# Patient Record
Sex: MALE | Race: WHITE | NOT HISPANIC OR LATINO
[De-identification: names, ages, dates, MRNs, and addresses within clinical notes are randomized per-mention and may not be internally consistent; named-entity substitution may affect disease eponyms.]

---

## 2020-01-06 PROBLEM — Z00.00 ENCOUNTER FOR PREVENTIVE HEALTH EXAMINATION: Status: ACTIVE | Noted: 2020-01-06

## 2020-01-07 ENCOUNTER — APPOINTMENT (OUTPATIENT)
Dept: OTOLARYNGOLOGY | Facility: CLINIC | Age: 70
End: 2020-01-07
Payer: COMMERCIAL

## 2020-01-07 VITALS
DIASTOLIC BLOOD PRESSURE: 81 MMHG | BODY MASS INDEX: 22.4 KG/M2 | HEART RATE: 77 BPM | WEIGHT: 160 LBS | SYSTOLIC BLOOD PRESSURE: 119 MMHG | HEIGHT: 71 IN

## 2020-01-07 DIAGNOSIS — H93.12 TINNITUS, LEFT EAR: ICD-10-CM

## 2020-01-07 DIAGNOSIS — H93.292 OTHER ABNORMAL AUDITORY PERCEPTIONS, LEFT EAR: ICD-10-CM

## 2020-01-07 PROCEDURE — 92557 COMPREHENSIVE HEARING TEST: CPT

## 2020-01-07 PROCEDURE — 99203 OFFICE O/P NEW LOW 30 MIN: CPT

## 2020-01-07 PROCEDURE — 92550 TYMPANOMETRY & REFLEX THRESH: CPT

## 2020-01-07 NOTE — HISTORY OF PRESENT ILLNESS
[de-identified] : ABDIAZIZ FRANCO is a 69 year man with a history of possible hearing loss. He complains of several months of fullness, decreased hearing and mild tinnitus AS.

## 2020-01-07 NOTE — CONSULT LETTER
[Dear  ___] : Dear  [unfilled], [Consult Closing:] : Thank you very much for allowing me to participate in the care of this patient.  If you have any questions, please do not hesitate to contact me. [Sincerely,] : Sincerely, [FreeTextEntry3] : Ramu Whipple MD\par

## 2020-01-07 NOTE — ASSESSMENT
[FreeTextEntry1] : ABDIAZIZ FRANCO has left sided hearing loss (moderately severe) and left sided tinnitus. I will send him for MRI w/wo contrast of IAC's.

## 2020-09-22 ENCOUNTER — RESULT REVIEW (OUTPATIENT)
Age: 70
End: 2020-09-22

## 2020-09-22 ENCOUNTER — OUTPATIENT (OUTPATIENT)
Dept: OUTPATIENT SERVICES | Facility: HOSPITAL | Age: 70
LOS: 1 days | End: 2020-09-22
Payer: COMMERCIAL

## 2020-09-22 PROCEDURE — A9585: CPT

## 2020-09-22 PROCEDURE — 70553 MRI BRAIN STEM W/O & W/DYE: CPT | Mod: 26

## 2020-09-22 PROCEDURE — 70553 MRI BRAIN STEM W/O & W/DYE: CPT

## 2020-09-29 ENCOUNTER — APPOINTMENT (OUTPATIENT)
Dept: MRI IMAGING | Facility: HOSPITAL | Age: 70
End: 2020-09-29
Payer: COMMERCIAL

## 2020-10-13 ENCOUNTER — OUTPATIENT (OUTPATIENT)
Dept: OUTPATIENT SERVICES | Facility: HOSPITAL | Age: 70
LOS: 1 days | End: 2020-10-13

## 2020-10-13 ENCOUNTER — RESULT REVIEW (OUTPATIENT)
Age: 70
End: 2020-10-13

## 2020-10-13 ENCOUNTER — APPOINTMENT (OUTPATIENT)
Dept: CT IMAGING | Facility: CLINIC | Age: 70
End: 2020-10-13
Payer: COMMERCIAL

## 2020-10-13 PROCEDURE — 70486 CT MAXILLOFACIAL W/O DYE: CPT | Mod: 26

## 2020-10-26 ENCOUNTER — APPOINTMENT (OUTPATIENT)
Dept: OTOLARYNGOLOGY | Facility: CLINIC | Age: 70
End: 2020-10-26
Payer: COMMERCIAL

## 2020-10-26 VITALS — TEMPERATURE: 98.7 F | WEIGHT: 160 LBS | HEIGHT: 71 IN | BODY MASS INDEX: 22.4 KG/M2

## 2020-10-26 DIAGNOSIS — J33.8 OTHER POLYP OF SINUS: ICD-10-CM

## 2020-10-26 PROCEDURE — 99213 OFFICE O/P EST LOW 20 MIN: CPT | Mod: 25

## 2020-10-26 PROCEDURE — 99072 ADDL SUPL MATRL&STAF TM PHE: CPT

## 2020-10-26 PROCEDURE — 31231 NASAL ENDOSCOPY DX: CPT

## 2020-10-26 RX ORDER — FLUTICASONE PROPIONATE 50 MCG
SPRAY, SUSPENSION NASAL
Refills: 0 | Status: ACTIVE | COMMUNITY

## 2020-10-26 NOTE — ASSESSMENT
[FreeTextEntry1] : He is incidentally isolated left sphenoid opacification it is likely fungal in origin.\par I am recommending sphenoidotomy.\par We discussed doing this in the office using balloon sinuplasty in  an image guided fashion versus in the surgical suite.\par He has opted to do this procedure in the office.\par I did explain to him that at times this disease process can occur and sometimes a trip to the operating room as necessary.\par We also talked about risks which include but were not limited to anesthesia, bleeding, infection or skull base injury.

## 2020-10-26 NOTE — HISTORY OF PRESENT ILLNESS
[de-identified] : I am seeing this gentleman today at the suggestion of Dr. jurado.\par He had a workup with an MRI of his temporal bone. 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 It appears to be inspissated mucus or fungal sinusitis.\par He has other scattered sinus disease without air-fluid level

## 2020-10-26 NOTE — PROCEDURE
[FreeTextEntry6] : PROCEDURE NOTES\par \par PROCEDURE: Nasal endoscopy \par SURGEON: Dr. Kaur\par INDICATIONS: Assess for chronic sinusitis. \par ANESTHESIA: The patient was placed in a sitting position.  Following application of the topical anesthetic and decongestant, exam was performed with a zero degree endoscope.  The scope was passed along the right nasal floor to the nasopharynx.  It was then passed into the region of the middle meatus, middle turbinate, and sphenoethmoid region.  An identical procedure was performed on the left side.  The following findings were noted:\par \par The nasal mucosa was healthy appearing and the septum was roughly midline. The middle meatus and sphenoethmoid recesses were clear bilaterally. The nasopharynx was normal. \par

## 2020-11-05 ENCOUNTER — APPOINTMENT (OUTPATIENT)
Dept: OTOLARYNGOLOGY | Facility: CLINIC | Age: 70
End: 2020-11-05

## 2020-11-05 ENCOUNTER — APPOINTMENT (OUTPATIENT)
Dept: OTOLARYNGOLOGY | Facility: CLINIC | Age: 70
End: 2020-11-05
Payer: COMMERCIAL

## 2020-11-05 PROCEDURE — 99441: CPT | Mod: NC

## 2020-11-05 NOTE — HISTORY OF PRESENT ILLNESS
[de-identified] : I did a phone visit with him.\par After seeing need another opinion from an ENT doctor regarding his sphenoid sinusitis.\par According to him that  agreed with my plan.\par The doctor also mentioned to him that he should consider septoplasty.\par \par I explained to him that septoplasty would need to be done in the operating room. The procedure we have a 9 for hemoglobin in the office.\par \par Furthermore I do not see any clinical indication for septoplasty.

## 2020-11-19 ENCOUNTER — LABORATORY RESULT (OUTPATIENT)
Age: 70
End: 2020-11-19

## 2020-11-24 ENCOUNTER — APPOINTMENT (OUTPATIENT)
Dept: OTOLARYNGOLOGY | Facility: CLINIC | Age: 70
End: 2020-11-24
Payer: COMMERCIAL

## 2020-11-24 DIAGNOSIS — J32.3 CHRONIC SPHENOIDAL SINUSITIS: ICD-10-CM

## 2020-11-24 DIAGNOSIS — J32.4 CHRONIC PANSINUSITIS: ICD-10-CM

## 2020-11-24 PROCEDURE — 31297Z: CUSTOM

## 2020-11-24 PROCEDURE — 61782 SCAN PROC CRANIAL EXTRA: CPT

## 2020-11-30 LAB — BACTERIA FLD CULT: NORMAL

## 2022-03-07 ENCOUNTER — EMERGENCY (EMERGENCY)
Facility: HOSPITAL | Age: 72
LOS: 1 days | Discharge: HOME | End: 2022-03-07
Attending: EMERGENCY MEDICINE | Admitting: EMERGENCY MEDICINE
Payer: COMMERCIAL

## 2022-03-07 VITALS
HEART RATE: 100 BPM | SYSTOLIC BLOOD PRESSURE: 140 MMHG | WEIGHT: 160.06 LBS | RESPIRATION RATE: 18 BRPM | OXYGEN SATURATION: 96 % | HEIGHT: 71 IN | TEMPERATURE: 99 F | DIASTOLIC BLOOD PRESSURE: 91 MMHG

## 2022-03-07 VITALS
RESPIRATION RATE: 16 BRPM | OXYGEN SATURATION: 93 % | HEART RATE: 78 BPM | SYSTOLIC BLOOD PRESSURE: 117 MMHG | TEMPERATURE: 101 F | DIASTOLIC BLOOD PRESSURE: 80 MMHG

## 2022-03-07 LAB
ALBUMIN SERPL ELPH-MCNC: 4.4 G/DL — SIGNIFICANT CHANGE UP (ref 3.3–5)
ALP SERPL-CCNC: 79 U/L — SIGNIFICANT CHANGE UP (ref 40–120)
ALT FLD-CCNC: 56 U/L — HIGH (ref 10–45)
ANION GAP SERPL CALC-SCNC: 13 MMOL/L — SIGNIFICANT CHANGE UP (ref 5–17)
AST SERPL-CCNC: 49 U/L — HIGH (ref 10–40)
BASOPHILS # BLD AUTO: 0.02 K/UL — SIGNIFICANT CHANGE UP (ref 0–0.2)
BASOPHILS NFR BLD AUTO: 0.3 % — SIGNIFICANT CHANGE UP (ref 0–2)
BILIRUB SERPL-MCNC: 0.6 MG/DL — SIGNIFICANT CHANGE UP (ref 0.2–1.2)
BUN SERPL-MCNC: 17 MG/DL — SIGNIFICANT CHANGE UP (ref 7–23)
CALCIUM SERPL-MCNC: 9.4 MG/DL — SIGNIFICANT CHANGE UP (ref 8.4–10.5)
CHLORIDE SERPL-SCNC: 100 MMOL/L — SIGNIFICANT CHANGE UP (ref 96–108)
CO2 SERPL-SCNC: 26 MMOL/L — SIGNIFICANT CHANGE UP (ref 22–31)
CREAT SERPL-MCNC: 1.25 MG/DL — SIGNIFICANT CHANGE UP (ref 0.5–1.3)
EGFR: 62 ML/MIN/1.73M2 — SIGNIFICANT CHANGE UP
EOSINOPHIL # BLD AUTO: 0 K/UL — SIGNIFICANT CHANGE UP (ref 0–0.5)
EOSINOPHIL NFR BLD AUTO: 0 % — SIGNIFICANT CHANGE UP (ref 0–6)
GLUCOSE SERPL-MCNC: 104 MG/DL — HIGH (ref 70–99)
HCT VFR BLD CALC: 42.7 % — SIGNIFICANT CHANGE UP (ref 39–50)
HGB BLD-MCNC: 13.7 G/DL — SIGNIFICANT CHANGE UP (ref 13–17)
IMM GRANULOCYTES NFR BLD AUTO: 0.4 % — SIGNIFICANT CHANGE UP (ref 0–1.5)
LYMPHOCYTES # BLD AUTO: 1.5 K/UL — SIGNIFICANT CHANGE UP (ref 1–3.3)
LYMPHOCYTES # BLD AUTO: 19.5 % — SIGNIFICANT CHANGE UP (ref 13–44)
MCHC RBC-ENTMCNC: 30.8 PG — SIGNIFICANT CHANGE UP (ref 27–34)
MCHC RBC-ENTMCNC: 32.1 GM/DL — SIGNIFICANT CHANGE UP (ref 32–36)
MCV RBC AUTO: 96 FL — SIGNIFICANT CHANGE UP (ref 80–100)
MONOCYTES # BLD AUTO: 0.9 K/UL — SIGNIFICANT CHANGE UP (ref 0–0.9)
MONOCYTES NFR BLD AUTO: 11.7 % — SIGNIFICANT CHANGE UP (ref 2–14)
NEUTROPHILS # BLD AUTO: 5.23 K/UL — SIGNIFICANT CHANGE UP (ref 1.8–7.4)
NEUTROPHILS NFR BLD AUTO: 68.1 % — SIGNIFICANT CHANGE UP (ref 43–77)
NRBC # BLD: 0 /100 WBCS — SIGNIFICANT CHANGE UP (ref 0–0)
PLATELET # BLD AUTO: 179 K/UL — SIGNIFICANT CHANGE UP (ref 150–400)
POTASSIUM SERPL-MCNC: 4 MMOL/L — SIGNIFICANT CHANGE UP (ref 3.5–5.3)
POTASSIUM SERPL-SCNC: 4 MMOL/L — SIGNIFICANT CHANGE UP (ref 3.5–5.3)
PROT SERPL-MCNC: 7.2 G/DL — SIGNIFICANT CHANGE UP (ref 6–8.3)
RBC # BLD: 4.45 M/UL — SIGNIFICANT CHANGE UP (ref 4.2–5.8)
RBC # FLD: 12.4 % — SIGNIFICANT CHANGE UP (ref 10.3–14.5)
SARS-COV-2 RNA SPEC QL NAA+PROBE: DETECTED
SODIUM SERPL-SCNC: 139 MMOL/L — SIGNIFICANT CHANGE UP (ref 135–145)
WBC # BLD: 7.68 K/UL — SIGNIFICANT CHANGE UP (ref 3.8–10.5)
WBC # FLD AUTO: 7.68 K/UL — SIGNIFICANT CHANGE UP (ref 3.8–10.5)

## 2022-03-07 PROCEDURE — 85025 COMPLETE CBC W/AUTO DIFF WBC: CPT

## 2022-03-07 PROCEDURE — 96365 THER/PROPH/DIAG IV INF INIT: CPT

## 2022-03-07 PROCEDURE — 36415 COLL VENOUS BLD VENIPUNCTURE: CPT

## 2022-03-07 PROCEDURE — 80053 COMPREHEN METABOLIC PANEL: CPT

## 2022-03-07 PROCEDURE — 99285 EMERGENCY DEPT VISIT HI MDM: CPT | Mod: 25

## 2022-03-07 PROCEDURE — 87635 SARS-COV-2 COVID-19 AMP PRB: CPT

## 2022-03-07 PROCEDURE — 93005 ELECTROCARDIOGRAM TRACING: CPT

## 2022-03-07 PROCEDURE — 94640 AIRWAY INHALATION TREATMENT: CPT

## 2022-03-07 PROCEDURE — 96375 TX/PRO/DX INJ NEW DRUG ADDON: CPT

## 2022-03-07 PROCEDURE — 99285 EMERGENCY DEPT VISIT HI MDM: CPT

## 2022-03-07 RX ORDER — DEXAMETHASONE 0.5 MG/5ML
10 ELIXIR ORAL ONCE
Refills: 0 | Status: COMPLETED | OUTPATIENT
Start: 2022-03-07 | End: 2022-03-07

## 2022-03-07 RX ORDER — ALBUTEROL 90 UG/1
2 AEROSOL, METERED ORAL ONCE
Refills: 0 | Status: COMPLETED | OUTPATIENT
Start: 2022-03-07 | End: 2022-03-07

## 2022-03-07 RX ORDER — KETOROLAC TROMETHAMINE 30 MG/ML
15 SYRINGE (ML) INJECTION ONCE
Refills: 0 | Status: DISCONTINUED | OUTPATIENT
Start: 2022-03-07 | End: 2022-03-07

## 2022-03-07 RX ORDER — SODIUM CHLORIDE 9 MG/ML
1000 INJECTION INTRAMUSCULAR; INTRAVENOUS; SUBCUTANEOUS ONCE
Refills: 0 | Status: COMPLETED | OUTPATIENT
Start: 2022-03-07 | End: 2022-03-07

## 2022-03-07 RX ORDER — ACETAMINOPHEN 500 MG
650 TABLET ORAL ONCE
Refills: 0 | Status: COMPLETED | OUTPATIENT
Start: 2022-03-07 | End: 2022-03-07

## 2022-03-07 RX ADMIN — Medication 10 MILLIGRAM(S): at 13:47

## 2022-03-07 RX ADMIN — Medication 15 MILLIGRAM(S): at 13:47

## 2022-03-07 RX ADMIN — SODIUM CHLORIDE 1000 MILLILITER(S): 9 INJECTION INTRAMUSCULAR; INTRAVENOUS; SUBCUTANEOUS at 12:52

## 2022-03-07 RX ADMIN — Medication 650 MILLIGRAM(S): at 14:27

## 2022-03-07 RX ADMIN — Medication 102 MILLIGRAM(S): at 12:52

## 2022-03-07 RX ADMIN — Medication 15 MILLIGRAM(S): at 12:52

## 2022-03-07 RX ADMIN — ALBUTEROL 2 PUFF(S): 90 AEROSOL, METERED ORAL at 14:12

## 2022-03-07 NOTE — ED PROVIDER NOTE - OBJECTIVE STATEMENT
71 M co sore throat- px w covid + - mild sx x 5 days- most prominent sx now is sore throat- painful to swallow- taking tylenol without sig improvement of pain  hacky cough- no sob vax moderna x 2 + booster  yasmin po- but limited secondary to throat pain  no abd pain/diarrhea  mod severity

## 2022-03-07 NOTE — ED PROVIDER NOTE - ENMT, MLM
Airway patent, Nasal mucosa clear. Mouth with normal mucosa. Throat has no vesicles, no oropharyngeal exudates and uvula is midline. + mild pharyngeal redness

## 2022-03-07 NOTE — ED PROVIDER NOTE - PATIENT PORTAL LINK FT
You can access the FollowMyHealth Patient Portal offered by [facility] by registering at the following website: [email or web address]. By joining [facility]’s FollowMyHealth portal, you will also be able to view your health information using other applications (apps) compatible with our system.

## 2022-03-07 NOTE — ED ADULT NURSE NOTE - OBJECTIVE STATEMENT
Pt is 72 y/o male came in with a complain of sore throat and productive cough. Pt is able to communicate well is simple sentences. A&O x 3. Pt denies chills, chest pain, sob, nausea, weakness, headache and dizziness. Per patient he doesn't have any medical hx. Per patient he had fever early this morning and he took tylenol. Pt aslo states " I couldn't eat properly because of my sore throat".

## 2022-03-07 NOTE — ED PROVIDER NOTE - NSFOLLOWUPINSTRUCTIONS_ED_ALL_ED_FT
COVID-19 (CORONAVIRUS DISEASE 2019) - [facility](R) Instructions(ER/ED)           COVID-19 (Coronavirus Disease 2019)    WHAT YOU NEED TO KNOW:    COVID-19 is the disease caused by a coronavirus first discovered in December 2019. Coronaviruses generally cause upper respiratory (nose, throat, and lung) infections, such as a cold. The 2019 virus spreads quickly and easily. It can be spread starting 2 to 3 days before symptoms even begin.    DISCHARGE INSTRUCTIONS:    Call your local emergency number (911 in the ) if:   •You have trouble breathing or shortness of breath at rest.      •You have chest pain or pressure that lasts longer than 5 minutes.      •You become confused or hard to wake.      •Your lips or face are blue.      Return to the emergency department if:   •You have a fever of 104°F (40°C) or higher.          Call your doctor if:   •You have symptoms of COVID-19.      •You have questions or concerns about your condition or care.      Medicines: You may need any of the following:  •Decongestants help reduce nasal congestion and help you breathe more easily. If you take decongestant pills, they may make you feel restless or cause problems with your sleep. Do not use decongestant sprays for more than a few days.      •Cough suppressants help reduce coughing. Ask your healthcare provider which type of cough medicine is best for you.      •Acetaminophen decreases pain and fever. It is available without a doctor's order. Ask how much to take and how often to take it. Follow directions. Read the labels of all other medicines you are using to see if they also contain acetaminophen, or ask your doctor or pharmacist. Acetaminophen can cause liver damage if not taken correctly. Do not use more than 4 grams (4,000 milligrams) total of acetaminophen in one day.       •NSAIDs, such as ibuprofen, help decrease swelling, pain, and fever. This medicine is available with or without a doctor's order. NSAIDs can cause stomach bleeding or kidney problems in certain people. If you take blood thinner medicine, always ask your healthcare provider if NSAIDs are safe for you. Always read the medicine label and follow directions.      •Blood thinners help prevent blood clots. Clots can cause strokes, heart attacks, and death. The following are general safety guidelines to follow while you are taking a blood thinner:?Watch for bleeding and bruising while you take blood thinners. Watch for bleeding from your gums or nose. Watch for blood in your urine and bowel movements. Use a soft washcloth on your skin, and a soft toothbrush to brush your teeth. This can keep your skin and gums from bleeding. If you shave, use an electric shaver. Do not play contact sports.       ?Tell your dentist and other healthcare providers that you take a blood thinner. Wear a bracelet or necklace that says you take this medicine.       ?Do not start or stop any other medicines unless your healthcare provider tells you to. Many medicines cannot be used with blood thinners.      ?Take your blood thinner exactly as prescribed by your healthcare provider. Do not skip does or take less than prescribed. Tell your provider right away if you forget to take your blood thinner, or if you take too much.      ?Warfarin is a blood thinner that you may need to take. The following are things you should be aware of if you take warfarin: ?Foods and medicines can affect the amount of warfarin in your blood. Do not make major changes to your diet while you take warfarin. Warfarin works best when you eat about the same amount of vitamin K every day. Vitamin K is found in green leafy vegetables and certain other foods. Ask for more information about what to eat when you are taking warfarin.      ?You will need to see your healthcare provider for follow-up visits when you are on warfarin. You will need regular blood tests. These tests are used to decide how much medicine you need.         •Take your medicine as directed. Contact your healthcare provider if you think your medicine is not helping or if you have side effects. Tell him or her if you are allergic to any medicine. Keep a list of the medicines, vitamins, and herbs you take. Include the amounts, and when and why you take them. Bring the list or the pill bottles to follow-up visits. Carry your medicine list with you in case of an emergency.      What you need to know about variants: The virus has changed into several new forms (called variants) since it was discovered. The variants may be more contagious (easily spread) than the original form. Some may also cause more severe illness than others.    What you need to know about COVID-19 vaccines: Healthcare providers recommend a COVID-19 vaccine, even if you have already had COVID-19. You are considered fully vaccinated against COVID-19 two weeks after the final dose of any COVID-19 vaccine. Let your healthcare provider know when you have received the final dose of the vaccine. Make a copy of your vaccination card. Keep the original with you in case you need to show it. Keep the copy in a safe place.  •COVID-19 vaccines are given as a shot in 1 or 2 doses.   Vaccination is recommended for everyone 5 years or older.?One 2-dose vaccine is fully approved for those 16 years or older. This vaccine also has an emergency use authorization (EUA) for children 5 to 15 years old. No vaccine is currently available for children younger than 5 years.      ?A booster (additional) dose is given to help the immune system continue to protect against severe COVID-19.?A booster is recommended for all adults 18 or older. The booster can be a different brand of the COVID-19 vaccine than you originally received. The timing for the booster depends on the type of vaccine you received:?1-dose vaccine: The booster is given at least 2 months after you received the vaccine.      ?2-dose vaccine: The booster is given at least 5 or 6 months after the second dose.       ?A booster can be given to adolescents 12 to 17 years old. Only 1 COVID-19 vaccine has this EUA. The booster is given at least 5 months after the second dose of the original vaccine series.      ?A booster is recommended for immunocompromised children 5 to 11 years old. Only 1 COVID-19 vaccine has this EUA. The booster is given 28 days after the second dose.      COVID-19 Immunization Schedule         •Continue social distancing and other measures, even after you get the vaccine. Although it is not common, you can become infected after you get the vaccine. You may also be able to pass the virus to others without knowing you are infected.      •After you get the vaccine, check local, national, and international travel rules. You may need to be tested before you travel. Some countries require proof of a negative test before you travel. You may also need to quarantine after you return.      •Medicine may be given to prevent infection. The medicine can be given if you are at high risk for infection and cannot get the vaccine. It can also be given if your immune system does not respond well to the vaccine.      How the 2019 coronavirus spreads:   •Droplets are the main way all coronaviruses spread. The virus travels in droplets that form when a person talks, sings, coughs, or sneezes. The droplets can also float in the air for minutes or hours. Infection happens when you breathe in the droplets or get them in your eyes or nose. Close personal contact with an infected person increases your risk for infection. This means being within 6 feet (2 meters) of the person for at least 15 minutes over 24 hours.      •Person-to-person contact can spread the virus. For example, a person with the virus on his or her hands can spread it by shaking hands with someone.      •The virus can stay on objects and surfaces for up to 3 days. You may become infected by touching the object or surface and then touching your eyes or mouth.      Help lower the risk for COVID-19:   •Wash your hands often throughout the day. Use soap and water. Rub your soapy hands together, lacing your fingers, for at least 20 seconds. Rinse with warm, running water. Dry your hands with a clean towel or paper towel. Use hand  that contains alcohol if soap and water are not available. Teach children how to wash their hands and use hand .  Handwashing           •Cover sneezes and coughs. Turn your face away and cover your mouth and nose with a tissue. Throw the tissue away. Use the bend of your arm if a tissue is not available. Then wash your hands well with soap and water or use hand . Teach children how to cover a cough or sneeze.      •Wear a face covering (mask) when needed. Use a cloth covering with at least 2 layers. You can also create layers by putting a cloth covering over a disposable non-medical mask. Cover your mouth and your nose.  How to Wear a Face Covering (Mask)           •Follow worldwide, national, and local social distancing guidelines. Keep at least 6 feet (2 meters) between you and others.      •Try not to touch your face. If you get the virus on your hands, you can transfer it to your eyes, nose, or mouth and become infected. You can also transfer it to objects, surfaces, or people.      •Clean and disinfect high-touch surfaces and objects often. Use disinfecting wipes, or make a solution of 4 teaspoons of bleach in 1 quart (4 cups) of water.      •Ask about other vaccines you may need. Get the influenza (flu) vaccine as soon as recommended each year, usually starting in September or October. Get the pneumonia vaccine if recommended. Your healthcare provider can tell you if you should also get other vaccines, and when to get them.    Prevent COVID-19 Infection         Follow social distancing guidelines: National and local social distancing rules vary. Rules and restrictions may change over time as restrictions are lifted. The following are general guidelines:  •Stay home if you are sick or think you may have COVID-19. It is important to stay home if you are waiting for a testing appointment or for test results.      •Avoid close physical contact with anyone who does not live in your home. Do not shake hands with, hug, or kiss a person as a greeting. If you must use public transportation (such as a bus or subway), try to sit or stand away from others. Wear your face covering.      •Avoid in-person gatherings and crowds. Attend virtually if possible.      Follow up with your doctor as directed: Write down your questions so you remember to ask them during your visits.    For more information:   •Centers for Disease Control and Prevention  [address] [place] [address]  Phone: 1-573.459.7779  Web Address: http://www.cdc.gov           © Copyright [employer] 2022           back to top                          © Copyright [employer] 2022

## 2022-03-07 NOTE — ED PROVIDER NOTE - CLINICAL SUMMARY MEDICAL DECISION MAKING FREE TEXT BOX
covid- good amb sat- most prominent sx is throat pain- dose of steroids/nsaids prn, hydrate in ER- inhaler prn

## 2022-03-09 DIAGNOSIS — R05.1 ACUTE COUGH: ICD-10-CM

## 2022-03-09 DIAGNOSIS — U07.1 COVID-19: ICD-10-CM

## 2022-04-06 ENCOUNTER — APPOINTMENT (OUTPATIENT)
Dept: PHYSICAL MEDICINE AND REHAB | Facility: CLINIC | Age: 72
End: 2022-04-06
Payer: COMMERCIAL

## 2022-04-06 VITALS — BODY MASS INDEX: 22.4 KG/M2 | HEIGHT: 71 IN | RESPIRATION RATE: 16 BRPM | WEIGHT: 160 LBS

## 2022-04-06 DIAGNOSIS — M47.817 SPONDYLOSIS W/OUT MYELOPATHY OR RADICULOPATHY, LUMBOSACRAL REGION: ICD-10-CM

## 2022-04-06 PROCEDURE — 99204 OFFICE O/P NEW MOD 45 MIN: CPT

## 2022-04-07 PROBLEM — M47.817 FACET DEGENERATION OF LUMBOSACRAL REGION: Status: ACTIVE | Noted: 2022-04-07

## 2022-04-07 RX ORDER — DICLOFENAC SODIUM 50 MG/1
50 TABLET, DELAYED RELEASE ORAL
Qty: 60 | Refills: 0 | Status: ACTIVE | COMMUNITY
Start: 2022-04-07 | End: 1900-01-01

## 2022-04-07 NOTE — PHYSICAL EXAM
[FreeTextEntry1] : LUMBAR\par GEN: AAOx3. NAD.\par INSP: Spine alignment midline. No evidence of scoliosis.\par STANCE: Trendelenburg/SLS (-) R (-) L. \par GAIT: (-) Antalgic. Normal reciprocating heel to toe\par SENS: Grossly intact to LT BLE.\par REFLEXES: Symmetric patella, achilles. \par TONE: Normal. No clonus.\par SPECIAL: SLR/Slump (-) R (-) L.   FADIR (-) R (-) L.     LAUREEN (-) R (-) L.  \par PALP: Midline/Spinous processes (-).   Paraspinals (-) R  (-) L.   \par            QL (-) R (-) L.      SIJ (-) R (-) L.            GTB (-) R (-) L.\par \par LSpine          ROM    Axial Sx    LE Sx \par Flex               Full       (-)           R (-) L (-).\par Ext                 Full       (+)          R (-) L (-).\par Lat Flex         Full       (+)F         R (-) L (-).       \par Rotation         Full       (-)           R (-) L (-). \par Oblique Ext    Full       (+)R        R (-) L (-).  \par \par HIP ROM:    RIGHT   Sx        LEFT   Sx\par Flex             Full       (-)         Full     (-)\par IR                 Full       (-)        Full     (-)\par ER               Full       (-)        Full     (-)\par \par STRENGTH:    RIGHT      LEFT\par Hip Flex            5/5 5/5\par Hip ABd            5/5 5/5\par Knee Ext           5/5 5/5\par Ankle DF           5/5 5/5\par Ankle PF           5/5 5/5\par EHL                   5/5 5/5\par EV                    5/5 5/5

## 2022-04-07 NOTE — DATA REVIEWED
[FreeTextEntry1] : XR LS 03/2022: Facet arthrosis in the lower lumbar spine\par XR PELVIS 9/2018: Preservation of joint spaces. \par \par MRI LSPINE 12/2018: Multilevel bilateral facet arthrosis. L3-4 Broad-based disc bulge; bilateral neural foraminal stenosis. L4-5 Broad-based disc bulge, with right-sided annular tear, bilateral neural foraminal narrowing.

## 2022-04-07 NOTE — ASSESSMENT
[FreeTextEntry1] : Impression:\par 1. RIGHT Lumbar Facet Syndrome\par \par Plan: The history, physical examination and imaging were reviewed. The imaging findings and diagnosis were discussed with the patient along with treatment options including physical therapy, oral medication, interventional spine procedures, and surgery; as well as alternative therapeutics such as acupuncture and massage. We also discussed the importance of weight loss, ergonomics, and posture as they pertain to the current condition as well as overall health and well being. We discussed interventional options vs a trial of conservative care, I am recommending that the patient undergo a course of physical therapy in addition to a short course of oral NSAIDs. I provided a Rx for Diclofenac. We discussed the medication in detail with regard to appropriate use, adverse effects, and expected outcome. We emphasized the importance of the home exercise program. The patient was educated on red flag symptoms and was instructed to call the office should the current condition worsen or new symptoms develop. The patient will follow up in 6 weeks. Depending on his response we will consider Facet CSI/MBB. The patient expressed verbal understanding and is in agreement with the plan of care. All of the patient's questions and concerns were addressed during today's visit.

## 2022-04-07 NOTE — HISTORY OF PRESENT ILLNESS
[FreeTextEntry1] : Referring Physician: Dr. Kalia Mcgowan MD\par \par Mr. ABDIAZIZ FRANCO is a very pleasant 71 year male who comes in for evaluation of Lower Back Pain that has been ongoing for 9 days without any specific injury or inciting event. Patient has tried injection, Ibuprofen,  Aleve which did help temporarily. The pain is located primarily Right Lower Back Pain radiating towards the UPPER RIGHT Leg intermittent and described as sharp, stabbing,throbbing. The pain is rated as 7/10 and ranges from 7-10/10. The patient's symptoms are worse in the morning on waking, aggravated by getting up, voss movement and alleviated by laying down/sitting. The patient works as a  which consists of sitting, standing. The patient denies any night pain, numbness/tingling, weakness, or bowel/bladder dysfunction. The patient has no other complaints at this time.\par \par \par \par

## 2023-03-31 ENCOUNTER — OUTPATIENT (OUTPATIENT)
Dept: OUTPATIENT SERVICES | Facility: HOSPITAL | Age: 73
LOS: 1 days | End: 2023-03-31
Payer: COMMERCIAL

## 2023-03-31 PROCEDURE — 73030 X-RAY EXAM OF SHOULDER: CPT | Mod: 26,LT

## 2023-08-24 ENCOUNTER — APPOINTMENT (OUTPATIENT)
Dept: MRI IMAGING | Facility: CLINIC | Age: 73
End: 2023-08-24
Payer: COMMERCIAL

## 2023-08-24 ENCOUNTER — OUTPATIENT (OUTPATIENT)
Dept: OUTPATIENT SERVICES | Facility: HOSPITAL | Age: 73
LOS: 1 days | End: 2023-08-24

## 2023-08-24 PROCEDURE — 73221 MRI JOINT UPR EXTREM W/O DYE: CPT | Mod: 26,LT

## 2023-08-31 ENCOUNTER — APPOINTMENT (OUTPATIENT)
Dept: ORTHOPEDIC SURGERY | Facility: CLINIC | Age: 73
End: 2023-08-31
Payer: COMMERCIAL

## 2023-08-31 VITALS — WEIGHT: 160 LBS | HEIGHT: 71 IN | BODY MASS INDEX: 22.4 KG/M2

## 2023-08-31 DIAGNOSIS — M75.42 IMPINGEMENT SYNDROME OF LEFT SHOULDER: ICD-10-CM

## 2023-08-31 PROCEDURE — 99204 OFFICE O/P NEW MOD 45 MIN: CPT

## 2023-08-31 PROCEDURE — 99024 POSTOP FOLLOW-UP VISIT: CPT

## 2024-09-03 ENCOUNTER — APPOINTMENT (OUTPATIENT)
Dept: ORTHOPEDIC SURGERY | Age: 74
End: 2024-09-03
Payer: MEDICARE

## 2024-09-03 VITALS
HEART RATE: 69 BPM | WEIGHT: 160 LBS | BODY MASS INDEX: 22.9 KG/M2 | DIASTOLIC BLOOD PRESSURE: 74 MMHG | HEIGHT: 70 IN | SYSTOLIC BLOOD PRESSURE: 123 MMHG | OXYGEN SATURATION: 96 %

## 2024-09-03 DIAGNOSIS — M47.817 SPONDYLOSIS W/OUT MYELOPATHY OR RADICULOPATHY, LUMBOSACRAL REGION: ICD-10-CM

## 2024-09-03 DIAGNOSIS — M79.18 MYALGIA, OTHER SITE: ICD-10-CM

## 2024-09-03 PROCEDURE — 99204 OFFICE O/P NEW MOD 45 MIN: CPT

## 2024-09-03 PROCEDURE — 72110 X-RAY EXAM L-2 SPINE 4/>VWS: CPT

## 2024-09-03 RX ORDER — CYCLOBENZAPRINE HYDROCHLORIDE 5 MG/1
5 TABLET, FILM COATED ORAL
Qty: 30 | Refills: 0 | Status: ACTIVE | COMMUNITY
Start: 2024-09-03 | End: 1900-01-01

## 2024-09-05 ENCOUNTER — NON-APPOINTMENT (OUTPATIENT)
Age: 74
End: 2024-09-05

## 2024-09-06 NOTE — HISTORY OF PRESENT ILLNESS
[de-identified] : ABDIAZIZ FRANCO is a 74-year-old male presents today for initial visit of low back pain.  Pain started April 2022 without any injuries. He saw Dr. Rodriguez in 04/2022.  Had MRI LSPINE 12/2018 which demonstrated Multilevel bilateral facet arthrosis. L3-4 Broad-based disc bulge; bilateral neural foraminal stenosis. L4-5 Broad-based disc bulge, with right-sided annular tear, bilateral neural foraminal narrowing. He went for PT in 2022, did it for 1 month and stopped.  He states the pain came back in July after sitting on a bench when out for dinner.  Attending PT for right knee pain for the last 6 weeks at [facility]. Took Diclofenac (left over from Dr. Rodriguez) which helped. Pt. rates the pain 2/10 today.  He adds pain is around the low back area. Denies any numbness  He states sitting makes the pain worse.  Retired  (securities regulation). Feels that switching his chair at home has exacerbated his pain. Exercises regularly: weights 2x/week

## 2024-09-06 NOTE — PHYSICAL EXAM
[de-identified] : General: Well-nourished, well-developed, alert, and in no acute distress. Head: Normocephalic. Eyes: Pupils equal, extraocular muscles intact, normal sclera. Nose: No nasal discharge. Cardiovascular: Extremities are warm and well perfused. Distal pulses are symmetric bilaterally. Respiratory: No labored breathing. Extremities: Sensation is intact distally bilaterally. Distal pulses are symmetric bilaterally Lymphatic: No regional lymphadenopathy, no lymphedema Neurologic: No focal deficits Skin: Normal skin color, texture, and turgor Psychiatric: Normal affect  MSK: Examination of the Lumbar Spine: Gait normal Ambulating independently. Able to toe walk, heel walk, tandem walk AROM: forward flexion, extension, lateral flexion, rotation Nontender to palpation: midline, paraspinals, SI jt, GTB, piriformis, gluteals   Lumbar Facet Loading [negative]   Right hip Range of Motion: Internal rotation: [25] degrees, External rotation: [80] degrees, Flexion [120] degrees     Log roll negative LAUREEN negative FADIR negative   SLR negative. Tight Hamstrings Piriformis compression negative ASIS distraction negative Iliac compression negative   Left hip:   Range of Motion: Internal rotation: [25] degrees, External rotation: [80] degrees, Flexion [120] degrees   Special tests: LAUREEN negative FADIR negative   SLR negative. Tight Hamstrings Piriformis compression negative ASIS distraction negative Iliac compression negative     Sensation is intact to light touch over the superficial and deep peroneal nerve distributions and the posterior tibial nerve distribution. Capillary refill is less than two seconds. Posterior tibial and dorsalis pedis pulses 2+ equal bilaterally. No calf swelling or tenderness bilaterally. Strength testing shows Hip flexion 5/5, Hip adduction 5/5, Hip abduction 5/5, Knee Extension 5/5, Knee Flexion 5/5, dorsiflexion 5/5, plantar flexion 5/5, EHL 5/5 Reflexes: Patellar 2+, Achilles 2+.  [de-identified] : Date: 09/03/2024 Location: [facility] Body part: L-SPINE x-ray independently reviewed and interpreted by me.  Impression: There is no evidence of fracture. Normal alignment. The intervertebral disc spaces are maintained.  There is no evidence of spondylolisthesis. Multilevel facet arthrosis.

## 2024-09-06 NOTE — DISCUSSION/SUMMARY

## 2024-09-06 NOTE — PHYSICAL EXAM
[de-identified] : General: Well-nourished, well-developed, alert, and in no acute distress. Head: Normocephalic. Eyes: Pupils equal, extraocular muscles intact, normal sclera. Nose: No nasal discharge. Cardiovascular: Extremities are warm and well perfused. Distal pulses are symmetric bilaterally. Respiratory: No labored breathing. Extremities: Sensation is intact distally bilaterally. Distal pulses are symmetric bilaterally Lymphatic: No regional lymphadenopathy, no lymphedema Neurologic: No focal deficits Skin: Normal skin color, texture, and turgor Psychiatric: Normal affect  MSK: Examination of the Lumbar Spine: Gait normal Ambulating independently. Able to toe walk, heel walk, tandem walk AROM: forward flexion, extension, lateral flexion, rotation Nontender to palpation: midline, paraspinals, SI jt, GTB, piriformis, gluteals   Lumbar Facet Loading [negative]   Right hip Range of Motion: Internal rotation: [25] degrees, External rotation: [80] degrees, Flexion [120] degrees     Log roll negative LAUREEN negative FADIR negative   SLR negative. Tight Hamstrings Piriformis compression negative ASIS distraction negative Iliac compression negative   Left hip:   Range of Motion: Internal rotation: [25] degrees, External rotation: [80] degrees, Flexion [120] degrees   Special tests: LAUREEN negative FADIR negative   SLR negative. Tight Hamstrings Piriformis compression negative ASIS distraction negative Iliac compression negative     Sensation is intact to light touch over the superficial and deep peroneal nerve distributions and the posterior tibial nerve distribution. Capillary refill is less than two seconds. Posterior tibial and dorsalis pedis pulses 2+ equal bilaterally. No calf swelling or tenderness bilaterally. Strength testing shows Hip flexion 5/5, Hip adduction 5/5, Hip abduction 5/5, Knee Extension 5/5, Knee Flexion 5/5, dorsiflexion 5/5, plantar flexion 5/5, EHL 5/5 Reflexes: Patellar 2+, Achilles 2+.  [de-identified] : Date: 09/03/2024 Location: [facility] Body part: L-SPINE x-ray independently reviewed and interpreted by me.  Impression: There is no evidence of fracture. Normal alignment. The intervertebral disc spaces are maintained.  There is no evidence of spondylolisthesis. Multilevel facet arthrosis.

## 2024-09-06 NOTE — DISCUSSION/SUMMARY

## 2024-09-06 NOTE — ASSESSMENT
[FreeTextEntry1] : ABDIAZIZ FRANCO is a 74 year old male with low back pain. I discussed with the patient that their symptoms, signs, and imaging are most consistent with facet arthropathy and myofascial pain. We reviewed the natural history of this condition and treatment options. We agreed on the following plan:   XR taken and reviewed with patient. Activity modification: low impact aerobic activity (stationary bike, elliptical, swimming). Recommend 150 min of moderate intensity aerobic activity per week. Start Home Exercises for spine conditioning. Demonstration, resistance bands and handout provided. Physical therapy. Referral provided. Medication: c/w Diclofenac 50mg BID prn refill prescription provided. Cyclobenzaprine 5-10 mg HS prn prescription provided. Apply heat to low back when supine and knees flexed at 90 deg. Demonstrated provided. Advanced imaging: consider MRI if no symptomatic improvement. Follow up in 6-8 weeks.

## 2024-09-06 NOTE — DISCUSSION/SUMMARY

## 2024-09-06 NOTE — HISTORY OF PRESENT ILLNESS
[de-identified] : ABDIAZIZ FRANCO is a 74-year-old male presents today for initial visit of low back pain.  Pain started April 2022 without any injuries. He saw Dr. Rodriguez in 04/2022.  Had MRI [facility] 12/2018 which demonstrated Multilevel bilateral facet arthrosis. L3-4 Broad-based disc bulge; bilateral neural foraminal stenosis. L4-5 Broad-based disc bulge, with right-sided annular tear, bilateral neural foraminal narrowing. He went for PT in 2022, did it for 1 month and stopped.  He states the pain came back in July after sitting on a bench when out for dinner.  Attending PT for right knee pain for the last 6 weeks at [facility]. Took Diclofenac (left over from Dr. Rodriguez) which helped. Pt. rates the pain 2/10 today.  He adds pain is around the low back area. Denies any numbness  He states sitting makes the pain worse.  Retired  (securities regulation). Feels that switching his chair at home has exacerbated his pain. Exercises regularly: weights 2x/week

## 2024-09-06 NOTE — HISTORY OF PRESENT ILLNESS
[de-identified] : ABDIAZIZ FRANCO is a 74-year-old male presents today for initial visit of low back pain.  Pain started April 2022 without any injuries. He saw Dr. Rodriguez in 04/2022.  Had MRI [facility] 12/2018 which demonstrated Multilevel bilateral facet arthrosis. L3-4 Broad-based disc bulge; bilateral neural foraminal stenosis. L4-5 Broad-based disc bulge, with right-sided annular tear, bilateral neural foraminal narrowing. He went for PT in 2022, did it for 1 month and stopped.  He states the pain came back in July after sitting on a bench when out for dinner.  Attending PT for right knee pain for the last 6 weeks at [facility]. Took Diclofenac (left over from Dr. Rodriguez) which helped. Pt. rates the pain 2/10 today.  He adds pain is around the low back area. Denies any numbness  He states sitting makes the pain worse.  Retired  (securities regulation). Feels that switching his chair at home has exacerbated his pain. Exercises regularly: weights 2x/week

## 2024-09-30 ENCOUNTER — APPOINTMENT (OUTPATIENT)
Dept: ORTHOPEDIC SURGERY | Facility: CLINIC | Age: 74
End: 2024-09-30
Payer: MEDICARE

## 2024-09-30 ENCOUNTER — RESULT REVIEW (OUTPATIENT)
Age: 74
End: 2024-09-30

## 2024-09-30 ENCOUNTER — OUTPATIENT (OUTPATIENT)
Dept: OUTPATIENT SERVICES | Facility: HOSPITAL | Age: 74
LOS: 1 days | End: 2024-09-30
Payer: MEDICARE

## 2024-09-30 VITALS
HEART RATE: 71 BPM | WEIGHT: 160 LBS | BODY MASS INDEX: 22.9 KG/M2 | SYSTOLIC BLOOD PRESSURE: 122 MMHG | HEIGHT: 70 IN | OXYGEN SATURATION: 97 % | DIASTOLIC BLOOD PRESSURE: 84 MMHG

## 2024-09-30 DIAGNOSIS — M77.02 MEDIAL EPICONDYLITIS, LEFT ELBOW: ICD-10-CM

## 2024-09-30 PROCEDURE — 73070 X-RAY EXAM OF ELBOW: CPT | Mod: 50

## 2024-09-30 PROCEDURE — 73070 X-RAY EXAM OF ELBOW: CPT

## 2024-09-30 PROCEDURE — 99214 OFFICE O/P EST MOD 30 MIN: CPT

## 2024-09-30 PROCEDURE — 73070 X-RAY EXAM OF ELBOW: CPT | Mod: 26,50

## 2024-09-30 RX ORDER — DICLOFENAC SODIUM 10 MG/G
1 GEL TOPICAL
Qty: 1 | Refills: 0 | Status: ACTIVE | COMMUNITY
Start: 2024-09-30 | End: 1900-01-01

## 2024-09-30 NOTE — PHYSICAL EXAM
[de-identified] : General: Well-nourished, well-developed, alert, and in no acute distress. Head: Normocephalic. Eyes: Pupils equal, extraocular muscles intact, normal sclera. Nose: No nasal discharge. Cardiovascular: Extremities are warm and well perfused. Distal pulses are symmetric bilaterally. Respiratory: No labored breathing. Extremities: Sensation is intact distally bilaterally. Distal pulses are symmetric bilaterally Lymphatic: No regional lymphadenopathy, no lymphedema Neurologic: No focal deficits Skin: Normal skin color, texture, and turgor Psychiatric: Normal affect MSK: Examination of left elbow:  No erythema, hematoma, ecchymosis, scars, lesions, swelling or deformity. AROM: flexion 150 deg, extension 180 deg, pronation 75 and supination 90 deg No pain at end of ROM No locking or crepitus Tender to palpation: medial epicondyle, flexor/pronator mass  Nontender to palpation: lateral epicondyle, extensor/supinator mass, olecranon, triceps tendon insertion, biceps tendon insertion Special tests: Valgus stress negative pain, laxity Varus stress negative pain, laxity Milking maneuver  negative pain, laxity Cozen's negative Maudsley negative Tender to palpation over medial epicondyle with wrist flexion Tinel's sign negative over Ulnar nerve at cubital tunnel    Examination of right elbow: No erythema, hematoma, ecchymosis, scars, lesions, swelling or deformity. AROM: flexion 150 deg, extension 180 deg, pronation 75 and supination 90 deg No pain at end of ROM No locking or crepitus Nontender to palpation: medial epicondyle, flexor/pronator mass, lateral epicondyle, extensor/supinator mass, olecranon, triceps tendon insertion, biceps tendon insertion    Sensation is intact to light touch over the median, radial, and ulnar nerve distributions bilaterally. Capillary refill is less than two seconds. Radial pulses 2+ equal bilaterally. Strength testing shows 5/5 biceps, 5/5 triceps. 5/5 wrist extension, 5/5 intrinsics. Reflexes 2+ biceps, brachioradialis. Santiago negative. [de-identified] : Date: 09/30/2024 Location: [facility] Body part: XRAY B/L ELBOW independently reviewed and interpreted by me. Impression: There is no evidence of fracture or dislocation. There is mild joint space narrowing bilaterally.

## 2024-09-30 NOTE — CONSULT LETTER
[Dear  ___] : Dear  [unfilled], [Consult Letter:] : I had the pleasure of evaluating your patient, [unfilled]. [Please see my note below.] : Please see my note below. [Consult Closing:] : Thank you very much for allowing me to participate in the care of this patient.  If you have any questions, please do not hesitate to contact me. [Sincerely,] : Sincerely, [FreeTextEntry3] : Nell Nails MD

## 2024-09-30 NOTE — ASSESSMENT
[FreeTextEntry1] : ABDIAZIZ FRANCO is a 74 year old male with left elbow pain. I discussed with the patient that their symptoms, signs, and imaging are most consistent with medial epicondylitis.  We reviewed the natural history of this condition and treatment options ranging from conservative measures (activity modification, physical therapy, icing, oral anti-inflammatory and/or analgesic medications, steroid injection) to surgical management.  We agreed on the following plan:  X-ray taken and reviewed with patient today. Activity modification such as gripping, weights, reducing the amount of weight and the reps performed based on symptoms. Start Home Exercises for golfer's elbow conditioning. Demonstration and handout provided.  Start physical therapy. Referral provided. Medication: diclofenac gel, PRN, prescription provided. Can also try ibuprofen or naproxen. Examples of medial epicondylitis brace provided today. Advanced imaging: consider MRI if no symptomatic improvement. Follow up in 6-8 weeks.

## 2024-09-30 NOTE — DISCUSSION/SUMMARY

## 2024-09-30 NOTE — HISTORY OF PRESENT ILLNESS
[de-identified] : ABDIAZIZ FRANCO is a 74-year-old male presents to follow up with a new problem of left elbow. Pain started 2 months ago without any injuries. Pain is around the medial aspect of the elbow/. He denies any prior treatments. He states he just stopped using the arm.  Pt reports back is improving but pt is still feeling pain in left elbow. Describes it as "stinging feeling". Pt has been resting from exercise for 3 weeks without relief, is not taking medication. Denies numbness/tingling, specific trauma. Last visit was 09/03/2024 pt. seen for low back pain, pain is much better, he is attending PT and performing home exercises.

## 2024-09-30 NOTE — PHYSICAL EXAM
[de-identified] : General: Well-nourished, well-developed, alert, and in no acute distress. Head: Normocephalic. Eyes: Pupils equal, extraocular muscles intact, normal sclera. Nose: No nasal discharge. Cardiovascular: Extremities are warm and well perfused. Distal pulses are symmetric bilaterally. Respiratory: No labored breathing. Extremities: Sensation is intact distally bilaterally. Distal pulses are symmetric bilaterally Lymphatic: No regional lymphadenopathy, no lymphedema Neurologic: No focal deficits Skin: Normal skin color, texture, and turgor Psychiatric: Normal affect MSK: Examination of left elbow:  No erythema, hematoma, ecchymosis, scars, lesions, swelling or deformity. AROM: flexion 150 deg, extension 180 deg, pronation 75 and supination 90 deg No pain at end of ROM No locking or crepitus Tender to palpation: medial epicondyle, flexor/pronator mass  Nontender to palpation: lateral epicondyle, extensor/supinator mass, olecranon, triceps tendon insertion, biceps tendon insertion Special tests: Valgus stress negative pain, laxity Varus stress negative pain, laxity Milking maneuver  negative pain, laxity Cozen's negative Maudsley negative Tender to palpation over medial epicondyle with wrist flexion Tinel's sign negative over Ulnar nerve at cubital tunnel    Examination of right elbow: No erythema, hematoma, ecchymosis, scars, lesions, swelling or deformity. AROM: flexion 150 deg, extension 180 deg, pronation 75 and supination 90 deg No pain at end of ROM No locking or crepitus Nontender to palpation: medial epicondyle, flexor/pronator mass, lateral epicondyle, extensor/supinator mass, olecranon, triceps tendon insertion, biceps tendon insertion    Sensation is intact to light touch over the median, radial, and ulnar nerve distributions bilaterally. Capillary refill is less than two seconds. Radial pulses 2+ equal bilaterally. Strength testing shows 5/5 biceps, 5/5 triceps. 5/5 wrist extension, 5/5 intrinsics. Reflexes 2+ biceps, brachioradialis. Santiago negative. [de-identified] : Date: 09/30/2024 Location: [facility] Body part: XRAY B/L ELBOW independently reviewed and interpreted by me. Impression: There is no evidence of fracture or dislocation. There is mild joint space narrowing bilaterally.

## 2024-09-30 NOTE — DISCUSSION/SUMMARY

## 2024-09-30 NOTE — END OF VISIT
[FreeTextEntry3] :   Documented by Davis Stallworth acting as a scribe for Dr. Nell Nails. 09/30/2024   All medical record entries made by the Davis Stallworth (Scribe) were at my, Dr. Nell Nails, direction and personally dictated by me on 09/30/2024. I have reviewed the chart and agree that the record accurately reflects my personal performance of the history, physical exam, assessment and plan. I have also personally directed, reviewed, and agreed with the chart. [Time Spent: ___ minutes] : I have spent [unfilled] minutes of time on the encounter which excludes teaching and separately reported services.

## 2024-09-30 NOTE — ADDENDUM
[FreeTextEntry1] :   Documented by Davis Stallworth acting as a scribe for Dr. Nell Nails. 09/30/2024

## 2024-09-30 NOTE — HISTORY OF PRESENT ILLNESS
[de-identified] : ABDIAZZI FRANCO is a 74-year-old male presents to follow up with a new problem of left elbow. Pain started 2 months ago without any injuries. Pain is around the medial aspect of the elbow/. He denies any prior treatments. He states he just stopped using the arm.  Pt reports back is improving but pt is still feeling pain in left elbow. Describes it as "stinging feeling". Pt has been resting from exercise for 3 weeks without relief, is not taking medication. Denies numbness/tingling, specific trauma. Last visit was 09/03/2024 pt. seen for low back pain, pain is much better, he is attending PT and performing home exercises.

## 2024-11-07 ENCOUNTER — APPOINTMENT (OUTPATIENT)
Dept: ORTHOPEDIC SURGERY | Facility: CLINIC | Age: 74
End: 2024-11-07

## 2025-01-14 ENCOUNTER — NON-APPOINTMENT (OUTPATIENT)
Age: 75
End: 2025-01-14

## 2025-01-14 ENCOUNTER — APPOINTMENT (OUTPATIENT)
Dept: OTOLARYNGOLOGY | Facility: CLINIC | Age: 75
End: 2025-01-14
Payer: MEDICARE

## 2025-01-14 ENCOUNTER — TRANSCRIPTION ENCOUNTER (OUTPATIENT)
Age: 75
End: 2025-01-14

## 2025-01-14 DIAGNOSIS — K21.9 GASTRO-ESOPHAGEAL REFLUX DISEASE W/OUT ESOPHAGITIS: ICD-10-CM

## 2025-01-14 DIAGNOSIS — J30.0 VASOMOTOR RHINITIS: ICD-10-CM

## 2025-01-14 DIAGNOSIS — R05.9 COUGH, UNSPECIFIED: ICD-10-CM

## 2025-01-14 PROCEDURE — 31575 DIAGNOSTIC LARYNGOSCOPY: CPT

## 2025-01-14 PROCEDURE — 99203 OFFICE O/P NEW LOW 30 MIN: CPT | Mod: 25

## 2025-01-14 RX ORDER — IPRATROPIUM BROMIDE 42 UG/1
0.06 SPRAY, METERED NASAL 3 TIMES DAILY
Qty: 1 | Refills: 2 | Status: ACTIVE | COMMUNITY
Start: 2025-01-14 | End: 1900-01-01

## 2025-04-17 ENCOUNTER — OUTPATIENT (OUTPATIENT)
Dept: OUTPATIENT SERVICES | Facility: HOSPITAL | Age: 75
LOS: 1 days | Discharge: HOME | End: 2025-04-17
Payer: MEDICARE

## 2025-04-17 ENCOUNTER — TRANSCRIPTION ENCOUNTER (OUTPATIENT)
Age: 75
End: 2025-04-17

## 2025-04-17 ENCOUNTER — RESULT REVIEW (OUTPATIENT)
Age: 75
End: 2025-04-17

## 2025-04-17 VITALS — WEIGHT: 160.06 LBS | HEIGHT: 68 IN

## 2025-04-17 PROCEDURE — 88305 TISSUE EXAM BY PATHOLOGIST: CPT | Mod: 26

## 2025-04-17 PROCEDURE — 43239 EGD BIOPSY SINGLE/MULTIPLE: CPT

## 2025-04-17 PROCEDURE — 88305 TISSUE EXAM BY PATHOLOGIST: CPT

## 2025-04-17 PROCEDURE — 45380 COLONOSCOPY AND BIOPSY: CPT | Mod: PT

## 2025-04-17 NOTE — PRE-ANESTHESIA EVALUATION ADULT - NSANTHOSAYNRD_GEN_A_CORE
No. ALEXUS screening performed.  STOP BANG Legend: 0-2 = LOW Risk; 3-4 = INTERMEDIATE Risk; 5-8 = HIGH Risk

## 2025-07-14 ENCOUNTER — APPOINTMENT (OUTPATIENT)
Dept: OTOLARYNGOLOGY | Facility: CLINIC | Age: 75
End: 2025-07-14
Payer: MEDICARE

## 2025-07-14 PROCEDURE — 99213 OFFICE O/P EST LOW 20 MIN: CPT | Mod: 25

## 2025-07-14 PROCEDURE — 31231 NASAL ENDOSCOPY DX: CPT

## (undated) DEVICE — FORCEP RADIAL JAW 4 W NDL 2.2MM 2.8MM 240CM ORANGE DISP